# Patient Record
Sex: FEMALE | Race: BLACK OR AFRICAN AMERICAN
[De-identification: names, ages, dates, MRNs, and addresses within clinical notes are randomized per-mention and may not be internally consistent; named-entity substitution may affect disease eponyms.]

---

## 2024-05-29 ENCOUNTER — APPOINTMENT (OUTPATIENT)
Dept: ORTHOPEDIC SURGERY | Facility: CLINIC | Age: 39
End: 2024-05-29
Payer: OTHER MISCELLANEOUS

## 2024-05-29 VITALS — BODY MASS INDEX: 27.49 KG/M2 | WEIGHT: 165 LBS | HEIGHT: 65 IN

## 2024-05-29 DIAGNOSIS — M54.16 RADICULOPATHY, LUMBAR REGION: ICD-10-CM

## 2024-05-29 DIAGNOSIS — Z78.9 OTHER SPECIFIED HEALTH STATUS: ICD-10-CM

## 2024-05-29 DIAGNOSIS — M25.522 PAIN IN LEFT ELBOW: ICD-10-CM

## 2024-05-29 DIAGNOSIS — M25.512 PAIN IN LEFT SHOULDER: ICD-10-CM

## 2024-05-29 PROCEDURE — 99203 OFFICE O/P NEW LOW 30 MIN: CPT

## 2024-05-29 PROCEDURE — 72040 X-RAY EXAM NECK SPINE 2-3 VW: CPT

## 2024-05-29 PROCEDURE — 72100 X-RAY EXAM L-S SPINE 2/3 VWS: CPT

## 2024-06-02 NOTE — PHYSICAL EXAM
[Rotation to left] : rotation to left [Rotation to right] : rotation to right [5___] : right grasp 5[unfilled]/5 [4___] : left wrist flexors 4[unfilled]/5 [Flexion] : flexion [Extension] : extension [Bending to left] : bending to left [Bending to right] : bending to right [de-identified] : Constitutional: - General Appearance: Unremarkable Body Habitus Well Developed Well Nourished Body Habitus No Deformities Well Groomed Ability To communicate: Normal Neurologic: Global sensation is intact to upper and lower extremities. See examination of Neck and/or Spine for exceptions. Orientation to Time, Place and Person is: Normal Mood And Affect is Normal Skin: - Head/Face, Right Upper/Lower Extremity, Left Upper/Lower Extremity: Normal See Examination of Neck and/or Spine for exceptions Cardiovascular: Peripheral Cardiovascular System is Normal Palpation of Lymph Nodes: Normal Palpation of lymph nodes in: Axilla, Cervical, Inguinal Abnormal Palpation of lymph nodes in: None  [de-identified] : LT arm resisted abduction 4-/5 secondary to pain above shoulder and elbow. triceps 4-/5 secondary to pain.  [de-identified] : left lateral flexion 10 degrees [de-identified] : right lateral flexion 10 degrees [FreeTextEntry8] : L>R sciatic notch.  [de-identified] : BL paresthesia's in LE diffused.  3+ patella reflex. 4+ Achilles.  3 beats of clonus BL.  [TWNoteComboBox7] : forward flexion 60 degrees [de-identified] : extension 0 degrees [] : full range of motion with pain in all planes of motion [FreeTextEntry9] : pain w/ rom of LT shoulder. Pain in lateral epicondyle on LT.

## 2024-06-02 NOTE — HISTORY OF PRESENT ILLNESS
[Work related] : work related [Sudden] : sudden [8] : 8 [9] : 9 [Burning] : burning [Dull/Aching] : dull/aching [Radiating] : radiating [Sharp] : sharp [Shooting] : shooting [Stabbing] : stabbing [Tightness] : tightness [Tingling] : tingling [Constant] : constant [Ice] : ice [Lying in bed] : lying in bed [Not working due to injury] : Work status: not working due to injury [de-identified] : 05/29/2024:  39 Y F presenting today for an initial evaluation. Workplace injury on 04/17/24. 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 Mechanism of injury- pt reports she slid on wet floor in room she entered, struck her head, injured arm, neck and low back in process on fall. Pt did lose consciousness and had an inability to speak for 2 hrs. Seen at [place] [facility] images completed (not specified type) but pt did not bring films bur verbally reports findings were normal. She reports mensuration started immediately after striking head. Reports neck pain w/ radicular symptoms to LUE. C/o severe neck and low back pains. Numbness/tingling in RUE, pain in LUE. BL LE paresthesia's. Treating w/ Bengay patch and Tylenol, mild relief. Pt has not been working since DOI. Seen by Dr. Davidson, referred to neurologist due to persistent concussion symptoms of headaches. Pt reports Dr. Aragon Rx'd medication that was She unable to take due to insurance. Pt has not treated w/ PT or muscle relaxers at this time. Chief complaint at this time is LT sided neck, trap, lt elbow, low back and LE pains.  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

## 2024-06-02 NOTE — DATA REVIEWED
[FreeTextEntry1] : On my interpretations of these images from [facility] in [place] on 05/29/2024: I have independently reviewed and interpreted these images. cervical xr- mild loss of vertebral height of C5 and c6. there is no listhesis, mild DDD of C4/5 C5/6, straightening of cervical lordosis, no fxs noted.   On my interpretations of these images from [facility] in [place] on 05/29/2024: I have independently reviewed and interpreted these images. 2 V lumbar xr- ap NL, lateral- NL.

## 2024-06-02 NOTE — DISCUSSION/SUMMARY
[de-identified] : 39 Y F w/ cervical radiculopathy, lumbar radiculopathy.   Significant weakness in LUE on exam today, 3-4 beats of clonus BL, + Moraes's sign BL, LUE pains and radiculopathy, I am requesting a cervical MRI for concern for cervical myelopathy. Significant pain with LT shoulder ROM, requesting LT shoulder MRI to eval for RTC. Isolated pains to lt elbow, requesting LT elbow MRI to eval for fx. She may attend PT after MRI is complete.  Patient was provided with a referral for cervical and lumbar physical therapy to work on stretching, strengthening and range of motion. Patient was provided with a cervical and lumbar home exercise program.   F/U in 1-2 WKS.   Prior to appointment and during encounter with patient extensive medical records were reviewed including but not limited to, hospital records, out patient records, imaging results, and lab data. During this appointment the patient was examined, diagnoses were discussed and explained in a face to face manner. In addition extensive time was spent reviewing aforementioned diagnostic studies. Counseling including abnormal image results, differential diagnoses, treatment options, risk and benefits, lifestyle changes, current condition, and current medications was performed. Patient's comments, questions, and concerns were address and patient verbalized understanding. Based on this patient's presentation at our office, which is an orthopedic spine surgeon's office, this patient inherently / intrinsically has a risk, however minute, of developing issues such as Cauda equina syndrome, bowel and bladder changes, or progression of motor or neurological deficits such as paralysis which may be permanent.   I, Abbey Marin, attest that this documentation has been prepared under the direction and in the presence of provider Burak Castano MD.

## 2024-06-04 ENCOUNTER — APPOINTMENT (OUTPATIENT)
Dept: MRI IMAGING | Facility: CLINIC | Age: 39
End: 2024-06-04

## 2024-06-05 ENCOUNTER — APPOINTMENT (OUTPATIENT)
Dept: MRI IMAGING | Facility: CLINIC | Age: 39
End: 2024-06-05

## 2024-06-21 ENCOUNTER — APPOINTMENT (OUTPATIENT)
Dept: ORTHOPEDIC SURGERY | Facility: CLINIC | Age: 39
End: 2024-06-21
Payer: OTHER MISCELLANEOUS

## 2024-06-21 VITALS — WEIGHT: 165 LBS | BODY MASS INDEX: 27.49 KG/M2 | HEIGHT: 65 IN

## 2024-06-21 DIAGNOSIS — M54.12 RADICULOPATHY, CERVICAL REGION: ICD-10-CM

## 2024-06-21 DIAGNOSIS — G95.9 DISEASE OF SPINAL CORD, UNSPECIFIED: ICD-10-CM

## 2024-06-21 PROCEDURE — 99213 OFFICE O/P EST LOW 20 MIN: CPT

## 2024-06-29 NOTE — DISCUSSION/SUMMARY
[de-identified] : 39 Y F w/ cervical radiculopathy, lumbar radiculopathy, cervical and lumbar strains.  I continue to believe obtaining MRI images will be beneficial for the pt as her diagnosis can properly be updated and treated appropriately. Significant weakness in LUE on exam today, 3-4 beats of clonus BL, + Moraes's sign BL, LUE pains and radiculopathy, I am requesting a cervical MRI for concern for cervical myelopathy and to properly update treatment plan. Significant pain with LT shoulder ROM, requesting LT shoulder MRI to eval for RTC tear. Isolated pains to LT elbow, requesting LT elbow MRI to eval for fx.    F/U in 4-6 WKS.    Prior to appointment and during encounter with patient extensive medical records were reviewed including but not limited to, hospital records, out patient records, imaging results, and lab data. During this appointment the patient was examined, diagnoses were discussed and explained in a face to face manner. In addition extensive time was spent reviewing aforementioned diagnostic studies. Counseling including abnormal image results, differential diagnoses, treatment options, risk and benefits, lifestyle changes, current condition, and current medications was performed. Patient's comments, questions, and concerns were address and patient verbalized understanding. Based on this patient's presentation at our office, which is an orthopedic spine surgeon's office, this patient inherently / intrinsically has a risk, however minute, of developing issues such as Cauda equina syndrome, bowel and bladder changes, or progression of motor or neurological deficits such as paralysis which may be permanent.   I, Abbey Marin, attest that this documentation has been prepared under the direction and in the presence of provider Burak Castano MD.

## 2024-06-29 NOTE — PHYSICAL EXAM
[Rotation to left] : rotation to left [Rotation to right] : rotation to right [5___] : right grasp 5[unfilled]/5 [Flexion] : flexion [Bending to left] : bending to left [Extension] : extension [Bending to right] : bending to right [4___] : left grasp 4[unfilled]/5 [de-identified] : Constitutional: - General Appearance: Unremarkable Body Habitus Well Developed Well Nourished Body Habitus No Deformities Well Groomed Ability To communicate: Normal Neurologic: Global sensation is intact to upper and lower extremities. See examination of Neck and/or Spine for exceptions. Orientation to Time, Place and Person is: Normal Mood And Affect is Normal Skin: - Head/Face, Right Upper/Lower Extremity, Left Upper/Lower Extremity: Normal See Examination of Neck and/or Spine for exceptions Cardiovascular: Peripheral Cardiovascular System is Normal Palpation of Lymph Nodes: Normal Palpation of lymph nodes in: Axilla, Cervical, Inguinal Abnormal Palpation of lymph nodes in: None  [de-identified] : pain into shoulder LT. digital extension 4/5 LT [de-identified] : left lateral flexion 10 degrees [de-identified] : right lateral flexion 10 degrees [FreeTextEntry8] : L>R sciatic notch.  [de-identified] : BL paresthesia's in LE diffused.  3+ patella reflex. 4+ Achilles.  3 beats of clonus BL.  [TWNoteComboBox7] : forward flexion 60 degrees [de-identified] : extension 0 degrees [] : full range of motion with pain in all planes of motion [FreeTextEntry9] : pain w/ rom of LT shoulder. Pain in lateral epicondyle on LT.  can abduct lt arm to 100 degrees.

## 2024-06-29 NOTE — HISTORY OF PRESENT ILLNESS
[Neck] : neck [Lower back] : lower back [Work related] : work related [7] : 7 [6] : 6 [Burning] : burning [Radiating] : radiating [Sharp] : sharp [Throbbing] : throbbing [Constant] : constant [Household chores] : household chores [Work] : work [Sitting] : sitting [Standing] : standing [de-identified] : 06/21/2024: Patient presenting today for a W/C FUV. MRI request denied by W/C, reasoning is unknown. She is ambulating with lumbar brace to aid in symptom control, unsure if provided relief. Persistent neck pains. Neck pain level is a 7/10. Radiation to LUE. Intermittent dropping items in left arm. Persistent BL UE and LE paresthesia's.   05/29/2024:  39 Y F presenting today for an initial evaluation. Workplace injury on 04/17/24. 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 Mechanism of injury- pt reports she slid on wet floor in room she entered, struck her head, injured arm, neck and low back in process on fall. Pt did lose consciousness and had an inability to speak for 2 hrs. Seen at [place] [facility] images completed (not specified type) but pt did not bring films but verbally reports findings were normal. She reports mensuration started immediately after striking head. Reports neck pain w/ radicular symptoms to LUE. C/o severe neck and low back pains. Numbness/tingling in RUE, pain in LUE. BL LE paresthesia's. Treating w/ Bengay patch and Tylenol, mild relief. Pt has not been working since DOI. Seen by Dr. Davidson, referred to neurologist due to persistent concussion symptoms of headaches. Pt reports Dr. Davidson Rx'd medication that was She unable to take due to insurance. Pt has not treated w/ PT or muscle relaxers at this time. Chief complaint at this time is LT sided neck, trap, lt elbow, low back and LE pains.  [] : no [FreeTextEntry3] : 04/17/2024 [FreeTextEntry5] : Cervical 6/10 at rest Lumbar 9/10 at rest [FreeTextEntry7] : Left shoulder, left elbow [FreeTextEntry9] : Tylenol [de-identified] : xray [facility]

## 2024-06-29 NOTE — DATA REVIEWED
[FreeTextEntry1] : On my interpretations of these images from [facility] in [place] on 05/29/2024: I have independently reviewed and interpreted these images. cervical xr- mild loss of vertebral height of C5 and c6. there is no listhesis, mild DDD of C4/5 C5/6, straightening of cervical lordosis, no fxs noted.   On my interpretations of these images from [facility] in [place] on 05/29/2024: I have independently reviewed and interpreted these images. 2 V lumbar xr- ap NL, lateral- NL.

## 2024-07-31 ENCOUNTER — APPOINTMENT (OUTPATIENT)
Dept: ORTHOPEDIC SURGERY | Facility: CLINIC | Age: 39
End: 2024-07-31
Payer: OTHER MISCELLANEOUS

## 2024-07-31 VITALS — BODY MASS INDEX: 27.49 KG/M2 | HEIGHT: 65 IN | WEIGHT: 165 LBS

## 2024-07-31 DIAGNOSIS — M54.12 RADICULOPATHY, CERVICAL REGION: ICD-10-CM

## 2024-07-31 DIAGNOSIS — M25.522 PAIN IN LEFT ELBOW: ICD-10-CM

## 2024-07-31 DIAGNOSIS — G95.9 DISEASE OF SPINAL CORD, UNSPECIFIED: ICD-10-CM

## 2024-07-31 DIAGNOSIS — M54.16 RADICULOPATHY, LUMBAR REGION: ICD-10-CM

## 2024-07-31 PROCEDURE — 99214 OFFICE O/P EST MOD 30 MIN: CPT

## 2024-08-04 NOTE — PHYSICAL EXAM
[Rotation to left] : rotation to left [Rotation to right] : rotation to right [5___] : right grasp 5[unfilled]/5 [Bending to left] : bending to left [Bending to right] : bending to right [Flexion] : flexion [Extension] : extension [Biceps 2+] : biceps 2+ [Triceps 2+] : triceps 2+ [Brachioradialis 2+] : brachioradialis 2+ [4___] : left dorsiflexors 4[unfilled]/5 [de-identified] : Constitutional: - General Appearance: Unremarkable Body Habitus Well Developed Well Nourished Body Habitus No Deformities Well Groomed Ability To communicate: Normal Neurologic: Global sensation is intact to upper and lower extremities. See examination of Neck and/or Spine for exceptions. Orientation to Time, Place and Person is: Normal Mood And Affect is Normal Skin: - Head/Face, Right Upper/Lower Extremity, Left Upper/Lower Extremity: Normal See Examination of Neck and/or Spine for exceptions Cardiovascular: Peripheral Cardiovascular System is Normal Palpation of Lymph Nodes: Normal Palpation of lymph nodes in: Axilla, Cervical, Inguinal Abnormal Palpation of lymph nodes in: None  [de-identified] : left lateral flexion 20 degrees [de-identified] : right lateral flexion 30 degrees [FreeTextEntry8] : L>R sciatic notch tenderness.  [de-identified] : 2+ Reflexes.  diffused paresthesia's in LLE.  [TWNoteComboBox7] : forward flexion 60 degrees [de-identified] : extension 0 degrees [] : full range of motion with pain in all planes of motion [FreeTextEntry9] : pain w/ rom of LT shoulder. Pain in lateral epicondyle on LT.  can abduct lt arm to 100 degrees.  very limited rom of lt shoulder.

## 2024-08-04 NOTE — PHYSICAL EXAM
[Rotation to left] : rotation to left [Rotation to right] : rotation to right [5___] : right grasp 5[unfilled]/5 [Bending to left] : bending to left [Bending to right] : bending to right [Flexion] : flexion [Extension] : extension [Biceps 2+] : biceps 2+ [Triceps 2+] : triceps 2+ [Brachioradialis 2+] : brachioradialis 2+ [4___] : left dorsiflexors 4[unfilled]/5 [de-identified] : Constitutional: - General Appearance: Unremarkable Body Habitus Well Developed Well Nourished Body Habitus No Deformities Well Groomed Ability To communicate: Normal Neurologic: Global sensation is intact to upper and lower extremities. See examination of Neck and/or Spine for exceptions. Orientation to Time, Place and Person is: Normal Mood And Affect is Normal Skin: - Head/Face, Right Upper/Lower Extremity, Left Upper/Lower Extremity: Normal See Examination of Neck and/or Spine for exceptions Cardiovascular: Peripheral Cardiovascular System is Normal Palpation of Lymph Nodes: Normal Palpation of lymph nodes in: Axilla, Cervical, Inguinal Abnormal Palpation of lymph nodes in: None  [de-identified] : left lateral flexion 20 degrees [de-identified] : right lateral flexion 30 degrees [FreeTextEntry8] : L>R sciatic notch tenderness.  [de-identified] : 2+ Reflexes.  diffused paresthesia's in LLE.  [TWNoteComboBox7] : forward flexion 60 degrees [de-identified] : extension 0 degrees [] : full range of motion with pain in all planes of motion [FreeTextEntry9] : pain w/ rom of LT shoulder. Pain in lateral epicondyle on LT.  can abduct lt arm to 100 degrees.  very limited rom of lt shoulder.

## 2024-08-04 NOTE — PHYSICAL EXAM
[Rotation to left] : rotation to left [Rotation to right] : rotation to right [5___] : right grasp 5[unfilled]/5 [Bending to left] : bending to left [Bending to right] : bending to right [Flexion] : flexion [Extension] : extension [Biceps 2+] : biceps 2+ [Triceps 2+] : triceps 2+ [Brachioradialis 2+] : brachioradialis 2+ [4___] : left dorsiflexors 4[unfilled]/5 [de-identified] : Constitutional: - General Appearance: Unremarkable Body Habitus Well Developed Well Nourished Body Habitus No Deformities Well Groomed Ability To communicate: Normal Neurologic: Global sensation is intact to upper and lower extremities. See examination of Neck and/or Spine for exceptions. Orientation to Time, Place and Person is: Normal Mood And Affect is Normal Skin: - Head/Face, Right Upper/Lower Extremity, Left Upper/Lower Extremity: Normal See Examination of Neck and/or Spine for exceptions Cardiovascular: Peripheral Cardiovascular System is Normal Palpation of Lymph Nodes: Normal Palpation of lymph nodes in: Axilla, Cervical, Inguinal Abnormal Palpation of lymph nodes in: None  [de-identified] : left lateral flexion 20 degrees [de-identified] : right lateral flexion 30 degrees [FreeTextEntry8] : L>R sciatic notch tenderness.  [de-identified] : 2+ Reflexes.  diffused paresthesia's in LLE.  [TWNoteComboBox7] : forward flexion 60 degrees [de-identified] : extension 0 degrees [] : full range of motion with pain in all planes of motion [FreeTextEntry9] : pain w/ rom of LT shoulder. Pain in lateral epicondyle on LT.  can abduct lt arm to 100 degrees.  very limited rom of lt shoulder.
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
## 2024-08-04 NOTE — DISCUSSION/SUMMARY
[de-identified] : 39 Y F w/ cervical radiculopathy, lumbar radiculopathy, cervical and lumbar strains. W/C DOI 04/17/24.   When pt fell while slipping on wet floor she injured her cervical spine and lumbar spine. As well as her LT arm, LT shoulder, and LT elbow, which may be attributed pains that originated in the neck or direct injury individual structures, will have UE evaluated by partners in that time- referral given to shoulder and hand. I have concerns that she is developing adhesive capsulitis, secondary to the inability move her shoulder as it is too painful.   I continue to believe MRIs of the cervical spine will be most appropriate to further complete evaluation and understanding of symptomatology.   I do not understand why MRIs have not been authorized, she clearly has radicular symptoms in LUE and LLE. Global LUE weakness on exam (4/5) She has diffused weakness in the LLE.  These findings can only be further evaluated with MRIs of the cervical and lumbar spine.   I do not understand why she has not had PT for her neck and back as her injury was 3 months ago and she continues to report progression.  pt's cervical & lumbar spasms and stiffness are progressing and increasing in severity as she unable to move secondary to severe pains. Patient was provided with a referral for cervical and lumbar physical therapy to work on stretching, strengthening and range of motion.    She cannot work at this time b/c of persistent and severe neck, lumbar, lue and lle pains. Patient remains temporarily and totally disabled from customary work at this time due to symptom severity. Patient will stay OOW. Note given.  Will see back in 1 month to review cervical and lumbar MRIs.   Prior to appointment and during encounter with patient extensive medical records were reviewed including but not limited to, hospital records, out patient records, imaging results, and lab data. During this appointment the patient was examined, diagnoses were discussed and explained in a face to face manner. In addition extensive time was spent reviewing aforementioned diagnostic studies. Counseling including abnormal image results, differential diagnoses, treatment options, risk and benefits, lifestyle changes, current condition, and current medications was performed. Patient's comments, questions, and concerns were address and patient verbalized understanding. Based on this patient's presentation at our office, which is an orthopedic spine surgeon's office, this patient inherently / intrinsically has a risk, however minute, of developing issues such as Cauda equina syndrome, bowel and bladder changes, or progression of motor or neurological deficits such as paralysis which may be permanent.   I, Abbey Marin, attest that this documentation has been prepared under the direction and in the presence of provider Burak Castano MD.

## 2024-08-04 NOTE — HISTORY OF PRESENT ILLNESS
[9] : 9 [8] : 8 [Radiating] : radiating [Constant] : constant [Not working due to injury] : Work status: not working due to injury [] : yes [de-identified] : 07/31/2024: Patient presenting today for a  FUV on 04/17/24 s/p slip and fall injury. She has not been treating with PT due to w/c not authorizing. She is not working. Neck pain radiates to LUE with weakness and numbness. Neck pain level is an 8/10. Persistent back pain.  LT sided LE radicular pains posteriorly from sciatic notch to posterior calf. Numbness & tingling in LLE.   06/21/2024: Patient presenting today for a W/C FUV. MRI request denied by W/C, reasoning is unknown. She is ambulating with lumbar brace to aid in symptom control, unsure if provided relief. Persistent neck pains. Neck pain level is a 7/10. Radiation to LUE. Intermittent dropping items in left arm. Persistent BL UE and LE paresthesia's.   05/29/2024:  39 Y F presenting today for an initial evaluation. Workplace injury on 04/17/24. 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 Mechanism of injury- pt reports she slid on wet floor in room she entered, struck her head, injured arm, neck and low back in process on fall. Pt did lose consciousness and had an inability to speak for 2 hrs. Seen at [place] ED images completed (not specified type) but pt did not bring films but verbally reports findings were normal. She reports mensuration started immediately after striking head. Reports neck pain w/ radicular symptoms to LUE. C/o severe neck and low back pains. Numbness/tingling in RUE, pain in LUE. BL LE paresthesia's. Treating w/ Bengay patch and Tylenol, mild relief. Pt has not been working since DOI. Seen by Dr. Davidson, referred to neurologist due to persistent concussion symptoms of headaches. Pt reports Dr. Davidson Rx'd medication that was She unable to take due to insurance. Pt has not treated w/ PT or muscle relaxers at this time. Chief complaint at this time is LT sided neck, trap, lt elbow, low back and LE pains.  [FreeTextEntry7] : lt shoulder/elbow [FreeTextEntry9] : bengar patches, naproxen [de-identified] : pain mgmt [de-identified] : movement

## 2024-08-04 NOTE — DATA REVIEWED
[FreeTextEntry1] : On my interpretations of these images from [facility] in [place] on 05/29/2024: I have independently reviewed and interpreted these images. cervical xr- mild loss of vertebral height of C5 and c6. there is no listhesis, mild DDD of C4/5 C5/6, straightening of cervical lordosis, no fxs noted.   On my interpretations of these images from [facility] in [place] on 05/29/2024: I have independently reviewed and interpreted these images. 2 V lumbar xr- ap NL, lateral- NL.

## 2024-08-04 NOTE — DISCUSSION/SUMMARY
[de-identified] : 39 Y F w/ cervical radiculopathy, lumbar radiculopathy, cervical and lumbar strains. W/C DOI 04/17/24.   When pt fell while slipping on wet floor she injured her cervical spine and lumbar spine. As well as her LT arm, LT shoulder, and LT elbow, which may be attributed pains that originated in the neck or direct injury individual structures, will have UE evaluated by partners in that time- referral given to shoulder and hand. I have concerns that she is developing adhesive capsulitis, secondary to the inability move her shoulder as it is too painful.   I continue to believe MRIs of the cervical spine will be most appropriate to further complete evaluation and understanding of symptomatology.   I do not understand why MRIs have not been authorized, she clearly has radicular symptoms in LUE and LLE. Global LUE weakness on exam (4/5) She has diffused weakness in the LLE.  These findings can only be further evaluated with MRIs of the cervical and lumbar spine.   I do not understand why she has not had PT for her neck and back as her injury was 3 months ago and she continues to report progression.  pt's cervical & lumbar spasms and stiffness are progressing and increasing in severity as she unable to move secondary to severe pains. Patient was provided with a referral for cervical and lumbar physical therapy to work on stretching, strengthening and range of motion.    She cannot work at this time b/c of persistent and severe neck, lumbar, lue and lle pains. Patient remains temporarily and totally disabled from customary work at this time due to symptom severity. Patient will stay OOW. Note given.  Will see back in 1 month to review cervical and lumbar MRIs.   Prior to appointment and during encounter with patient extensive medical records were reviewed including but not limited to, hospital records, out patient records, imaging results, and lab data. During this appointment the patient was examined, diagnoses were discussed and explained in a face to face manner. In addition extensive time was spent reviewing aforementioned diagnostic studies. Counseling including abnormal image results, differential diagnoses, treatment options, risk and benefits, lifestyle changes, current condition, and current medications was performed. Patient's comments, questions, and concerns were address and patient verbalized understanding. Based on this patient's presentation at our office, which is an orthopedic spine surgeon's office, this patient inherently / intrinsically has a risk, however minute, of developing issues such as Cauda equina syndrome, bowel and bladder changes, or progression of motor or neurological deficits such as paralysis which may be permanent.   I, Abbey Marin, attest that this documentation has been prepared under the direction and in the presence of provider Burak Castano MD.

## 2024-08-04 NOTE — DATA REVIEWED
[FreeTextEntry1] : On my interpretations of these images from [facility] in [place] on 05/29/2024: I have independently reviewed and interpreted these images. cervical xr- mild loss of vertebral height of C5 and c6. there is no listhesis, mild DDD of C4/5 C5/6, straightening of cervical lordosis, no fxs noted.   On my interpretations of these images from [facility] in [place] on 05/29/2024: I have independently reviewed and interpreted these images. 2 V lumbar xr- ap NL, lateral- NL.

## 2024-08-04 NOTE — HISTORY OF PRESENT ILLNESS
[9] : 9 [8] : 8 [Radiating] : radiating [Constant] : constant [Not working due to injury] : Work status: not working due to injury [] : yes [de-identified] : 07/31/2024: Patient presenting today for a  FUV on 04/17/24 s/p slip and fall injury. She has not been treating with PT due to w/c not authorizing. She is not working. Neck pain radiates to LUE with weakness and numbness. Neck pain level is an 8/10. Persistent back pain.  LT sided LE radicular pains posteriorly from sciatic notch to posterior calf. Numbness & tingling in LLE.   06/21/2024: Patient presenting today for a W/C FUV. MRI request denied by W/C, reasoning is unknown. She is ambulating with lumbar brace to aid in symptom control, unsure if provided relief. Persistent neck pains. Neck pain level is a 7/10. Radiation to LUE. Intermittent dropping items in left arm. Persistent BL UE and LE paresthesia's.   05/29/2024:  39 Y F presenting today for an initial evaluation. Workplace injury on 04/17/24. 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 Mechanism of injury- pt reports she slid on wet floor in room she entered, struck her head, injured arm, neck and low back in process on fall. Pt did lose consciousness and had an inability to speak for 2 hrs. Seen at [place] ED images completed (not specified type) but pt did not bring films but verbally reports findings were normal. She reports mensuration started immediately after striking head. Reports neck pain w/ radicular symptoms to LUE. C/o severe neck and low back pains. Numbness/tingling in RUE, pain in LUE. BL LE paresthesia's. Treating w/ Bengay patch and Tylenol, mild relief. Pt has not been working since DOI. Seen by Dr. Davidson, referred to neurologist due to persistent concussion symptoms of headaches. Pt reports Dr. Davidson Rx'd medication that was She unable to take due to insurance. Pt has not treated w/ PT or muscle relaxers at this time. Chief complaint at this time is LT sided neck, trap, lt elbow, low back and LE pains.  [FreeTextEntry7] : lt shoulder/elbow [FreeTextEntry9] : bengar patches, naproxen [de-identified] : movement [de-identified] : pain mgmt

## 2024-08-04 NOTE — DATA REVIEWED
[FreeTextEntry1] : On my interpretations of these images from [facility] in [place] on 05/29/2024: I have independently reviewed and interpreted these images. cervical xr- mild loss of vertebral height of C5 and c6. there is no listhesis, mild DDD of C4/5 C5/6, straightening of cervical lordosis, no fxs noted.   On my interpretations of these images from [facility] in [place] on 05/29/2024: I have independently reviewed and interpreted these images. 2 V lumbar xr- ap NL, lateral- NL.

## 2024-08-04 NOTE — DISCUSSION/SUMMARY
[de-identified] : 39 Y F w/ cervical radiculopathy, lumbar radiculopathy, cervical and lumbar strains. W/C DOI 04/17/24.   When pt fell while slipping on wet floor she injured her cervical spine and lumbar spine. As well as her LT arm, LT shoulder, and LT elbow, which may be attributed pains that originated in the neck or direct injury individual structures, will have UE evaluated by partners in that time- referral given to shoulder and hand. I have concerns that she is developing adhesive capsulitis, secondary to the inability move her shoulder as it is too painful.   I continue to believe MRIs of the cervical spine will be most appropriate to further complete evaluation and understanding of symptomatology.   I do not understand why MRIs have not been authorized, she clearly has radicular symptoms in LUE and LLE. Global LUE weakness on exam (4/5) She has diffused weakness in the LLE.  These findings can only be further evaluated with MRIs of the cervical and lumbar spine.   I do not understand why she has not had PT for her neck and back as her injury was 3 months ago and she continues to report progression.  pt's cervical & lumbar spasms and stiffness are progressing and increasing in severity as she unable to move secondary to severe pains. Patient was provided with a referral for cervical and lumbar physical therapy to work on stretching, strengthening and range of motion.    She cannot work at this time b/c of persistent and severe neck, lumbar, lue and lle pains. Patient remains temporarily and totally disabled from customary work at this time due to symptom severity. Patient will stay OOW. Note given.  Will see back in 1 month to review cervical and lumbar MRIs.   Prior to appointment and during encounter with patient extensive medical records were reviewed including but not limited to, hospital records, out patient records, imaging results, and lab data. During this appointment the patient was examined, diagnoses were discussed and explained in a face to face manner. In addition extensive time was spent reviewing aforementioned diagnostic studies. Counseling including abnormal image results, differential diagnoses, treatment options, risk and benefits, lifestyle changes, current condition, and current medications was performed. Patient's comments, questions, and concerns were address and patient verbalized understanding. Based on this patient's presentation at our office, which is an orthopedic spine surgeon's office, this patient inherently / intrinsically has a risk, however minute, of developing issues such as Cauda equina syndrome, bowel and bladder changes, or progression of motor or neurological deficits such as paralysis which may be permanent.   I, Abbey Marin, attest that this documentation has been prepared under the direction and in the presence of provider Burak Castano MD.

## 2024-08-04 NOTE — HISTORY OF PRESENT ILLNESS
[9] : 9 [8] : 8 [Radiating] : radiating [Constant] : constant [Not working due to injury] : Work status: not working due to injury [] : yes [de-identified] : 07/31/2024: Patient presenting today for a  FUV on 04/17/24 s/p slip and fall injury. She has not been treating with PT due to w/c not authorizing. She is not working. Neck pain radiates to LUE with weakness and numbness. Neck pain level is an 8/10. Persistent back pain.  LT sided LE radicular pains posteriorly from sciatic notch to posterior calf. Numbness & tingling in LLE.   06/21/2024: Patient presenting today for a W/C FUV. MRI request denied by W/C, reasoning is unknown. She is ambulating with lumbar brace to aid in symptom control, unsure if provided relief. Persistent neck pains. Neck pain level is a 7/10. Radiation to LUE. Intermittent dropping items in left arm. Persistent BL UE and LE paresthesia's.   05/29/2024:  39 Y F presenting today for an initial evaluation. Workplace injury on 04/17/24. 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 Mechanism of injury- pt reports she slid on wet floor in room she entered, struck her head, injured arm, neck and low back in process on fall. Pt did lose consciousness and had an inability to speak for 2 hrs. Seen at [place] ED images completed (not specified type) but pt did not bring films but verbally reports findings were normal. She reports mensuration started immediately after striking head. Reports neck pain w/ radicular symptoms to LUE. C/o severe neck and low back pains. Numbness/tingling in RUE, pain in LUE. BL LE paresthesia's. Treating w/ Bengay patch and Tylenol, mild relief. Pt has not been working since DOI. Seen by Dr. Davidson, referred to neurologist due to persistent concussion symptoms of headaches. Pt reports Dr. Davidson Rx'd medication that was She unable to take due to insurance. Pt has not treated w/ PT or muscle relaxers at this time. Chief complaint at this time is LT sided neck, trap, lt elbow, low back and LE pains.  [FreeTextEntry7] : lt shoulder/elbow [FreeTextEntry9] : bengar patches, naproxen [de-identified] : pain mgmt [de-identified] : movement

## 2024-08-07 ENCOUNTER — APPOINTMENT (OUTPATIENT)
Dept: ORTHOPEDIC SURGERY | Facility: CLINIC | Age: 39
End: 2024-08-07

## 2024-08-07 PROBLEM — M25.512 ACUTE PAIN OF LEFT SHOULDER: Status: ACTIVE | Noted: 2024-08-07

## 2024-08-07 PROBLEM — S43.52XD SPRAIN OF LEFT ACROMIOCLAVICULAR LIGAMENT, SUBSEQUENT ENCOUNTER: Status: ACTIVE | Noted: 2024-08-07

## 2024-08-07 PROBLEM — M75.52 BURSITIS OF SHOULDER, LEFT: Status: ACTIVE | Noted: 2024-08-07

## 2024-08-07 PROCEDURE — 99204 OFFICE O/P NEW MOD 45 MIN: CPT

## 2024-08-07 PROCEDURE — 73030 X-RAY EXAM OF SHOULDER: CPT | Mod: LT

## 2024-08-07 NOTE — WORK
[Torn Ligament/Tendon/Muscle] : torn ligament, tendon or muscle [Was the competent medical cause of the injury] : was the competent medical cause of the injury [Are consistent with the injury] : are consistent with the injury [Total (100%)] : total (100%) [Cannot return to work because ________] : cannot return to work because [unfilled] [N/A] : : Not Applicable [Patient] : patient [No Rx restrictions] : No Rx restrictions. [I provided the services listed above] :  I provided the services listed above. [FreeTextEntry1] : fair

## 2024-08-07 NOTE — HISTORY OF PRESENT ILLNESS
[de-identified] : 39-year-old female presenting for evaluation of left shoulder pain from a work-related injury that occurred 4/17/2024.  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  Reports injury occurred as a traumatic slip and fall onto water.  Patient has been under the care of spine specialist as she had also injured her cervical and lumbar spine.  Referred today for further evaluation of left shoulder pain.  Patient reports diffuse left shoulder discomfort.  Admits to limited range of motion secondary to pain.  Pain is constant, severe. She has been out of work since the injury.

## 2024-08-07 NOTE — DISCUSSION/SUMMARY
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  Patient has been initially under the care of a spine specialist and was referred today for further management of her left shoulder pain. X-rays today are nondiagnostic. Patient does have significant guarding and limited range of motion on exam today.  She does have tenderness in the region of her AC joint. Given the traumatic nature of her fall we are recommending MRI to further evaluate rotator cuff tear versus occult fracture. Based on the patient's history and physical exam findings, I am concerned about the possibility of a full-thickness rotator cuff tear. The patient has pain and subjective weakness consistent with this diagnosis. Therefore, I recommend a MRI to evaluate for a rotator cuff tear. This will also aid in evaluating for injury to biceps labral complex as well as for any signs of impingement. The patient will follow-up after MRI to discuss further treatment options. We will also submit for physical therapy to help improve range of motion considering patient has significant guarding and limited active motion on exam today.  Patient will follow-up once the MRI is complete to discuss further treatment options. She has been out of work since the injury.  (1) We discussed a comprehensive treatment plans that included a prescription management plan involving the use of prescription strength medications to include Ibuprofen 600-800 mg TID, versus 500-650 mg Tylenol. We also discussed prescribing topical diclofenac (Voltaren gel) as well as once daily Meloxicam 15 mg.  (2) The patient has More Than One chronic injuries/illnesses as outlined, discussed, and documented by ICD 10 codes listed, as well as the HPI and Plan section. There is a moderate risk of morbidity with further treatment, especially from use of prescription strength medications and possible side effects of these medications which include upset stomach and cardiac/renal issues with long term use were discussed.  (3) I recommended that the patient follow-up with their medical physician to discuss any significant specific potential issues with long term use such as interactions with current medications or with exacerbation of underlying medical morbidities. 
Likely anemia of chronic disease  - Hgb 9.9 on admission, was in the ranges of ~10s during last hospitalization in 01/2023  - Currently hemodynamically stable  - F/u iron panel: iron, ferritin, TIBC, transferrin  - F/u folate, vitamin B12  - Trend CBC

## 2024-08-16 ENCOUNTER — APPOINTMENT (OUTPATIENT)
Dept: ORTHOPEDIC SURGERY | Facility: CLINIC | Age: 39
End: 2024-08-16
Payer: OTHER MISCELLANEOUS

## 2024-08-16 PROCEDURE — 99075 MEDICAL TESTIMONY: CPT

## 2024-08-19 ENCOUNTER — APPOINTMENT (OUTPATIENT)
Dept: ORTHOPEDIC SURGERY | Facility: CLINIC | Age: 39
End: 2024-08-19
Payer: OTHER MISCELLANEOUS

## 2024-08-19 VITALS — HEIGHT: 65 IN | BODY MASS INDEX: 27.49 KG/M2 | WEIGHT: 165 LBS

## 2024-08-19 DIAGNOSIS — S53.402A UNSPECIFIED SPRAIN OF LEFT ELBOW, INITIAL ENCOUNTER: ICD-10-CM

## 2024-08-19 DIAGNOSIS — S50.02XA CONTUSION OF LEFT ELBOW, INITIAL ENCOUNTER: ICD-10-CM

## 2024-08-19 PROCEDURE — 73080 X-RAY EXAM OF ELBOW: CPT | Mod: LT

## 2024-08-19 PROCEDURE — 99203 OFFICE O/P NEW LOW 30 MIN: CPT

## 2024-08-19 NOTE — PHYSICAL EXAM
[] : tenderness over lateral epicondyle [Left] : left elbow [There are no fractures, subluxations or dislocations. No significant abnormalities are seen] : There are no fractures, subluxations or dislocations. No significant abnormalities are seen [Degenerative change] : Degenerative change [FreeTextEntry9] : Full ROM Full suppination and pronation

## 2024-08-19 NOTE — HISTORY OF PRESENT ILLNESS
[Work related] : work related [Gradual] : gradual [8] : 8 [Dull/Aching] : dull/aching [Sharp] : sharp [Shooting] : shooting [Constant] : constant [Household chores] : household chores [Rest] : rest [Not working due to injury] : Work status: not working due to injury [de-identified] : 39 year old female presents LT elbow pain. She slipped on a wet floor. She went to [facility] 4 months ago. She noticed elbow pain later that night  [] : no [FreeTextEntry1] : Left elbow [FreeTextEntry3] : 4/17/2024 [FreeTextEntry7] : left hand [FreeTextEntry5] : 4/17/2024 going into the resident room at work and slipped and fell on wet floor [de-identified] : no bearing weight and no pushing

## 2024-08-19 NOTE — WORK
[Sprain/Strain] : sprain/strain [Are consistent with the injury] : are consistent with the injury [Consistent with my objective findings] : consistent with my objective findings [Partial] : partial [No Rx restrictions] : No Rx restrictions. [I provided the services listed above] :  I provided the services listed above.

## 2024-08-19 NOTE — DISCUSSION/SUMMARY
[de-identified] : Discussed the nature of the diagnosis and risk and benefits of different modalities of treatment. LT elbow contusion   She will obtain an MRI Rx provided RTO after MRI  Male

## 2024-08-28 ENCOUNTER — APPOINTMENT (OUTPATIENT)
Dept: ORTHOPEDIC SURGERY | Facility: CLINIC | Age: 39
End: 2024-08-28
Payer: OTHER MISCELLANEOUS

## 2024-08-28 VITALS — BODY MASS INDEX: 27.66 KG/M2 | HEIGHT: 65 IN | WEIGHT: 166 LBS

## 2024-08-28 DIAGNOSIS — M54.16 RADICULOPATHY, LUMBAR REGION: ICD-10-CM

## 2024-08-28 DIAGNOSIS — G95.9 DISEASE OF SPINAL CORD, UNSPECIFIED: ICD-10-CM

## 2024-08-28 DIAGNOSIS — M54.12 RADICULOPATHY, CERVICAL REGION: ICD-10-CM

## 2024-08-28 PROCEDURE — 99213 OFFICE O/P EST LOW 20 MIN: CPT

## 2024-08-30 ENCOUNTER — APPOINTMENT (OUTPATIENT)
Dept: MRI IMAGING | Facility: CLINIC | Age: 39
End: 2024-08-30

## 2024-09-01 NOTE — PHYSICAL EXAM
[Rotation to left] : rotation to left [Rotation to right] : rotation to right [5___] : right grasp 5[unfilled]/5 [Biceps 2+] : biceps 2+ [Triceps 2+] : triceps 2+ [Brachioradialis 2+] : brachioradialis 2+ [Flexion] : flexion [Extension] : extension [Bending to left] : bending to left [Bending to right] : bending to right [4___] : left dorsiflexors 4[unfilled]/5 [de-identified] : Constitutional: - General Appearance: Unremarkable Body Habitus Well Developed Well Nourished Body Habitus No Deformities Well Groomed Ability To communicate: Normal Neurologic: Global sensation is intact to upper and lower extremities. See examination of Neck and/or Spine for exceptions. Orientation to Time, Place and Person is: Normal Mood And Affect is Normal Skin: - Head/Face, Right Upper/Lower Extremity, Left Upper/Lower Extremity: Normal See Examination of Neck and/or Spine for exceptions Cardiovascular: Peripheral Cardiovascular System is Normal Palpation of Lymph Nodes: Normal Palpation of lymph nodes in: Axilla, Cervical, Inguinal Abnormal Palpation of lymph nodes in: None  [de-identified] : resisted supination L 4/5 [de-identified] : left lateral rotation 45 degrees [TWNoteComboBox6] : right lateral rotation 45 degrees [] : non-antalgic [FreeTextEntry9] : pain w/ returning to extension.  [de-identified] : 2+ Reflexes.  diffused paresthesia's in LLE.  [TWNoteComboBox7] : forward flexion 60 degrees [de-identified] : extension 0 degrees

## 2024-09-01 NOTE — DATA REVIEWED
[FreeTextEntry1] : On my interpretations of these images from [facility] in [place] on 05/29/2024: I have independently reviewed and interpreted these images. cervical xr- mild loss of vertebral height of C5 and c6. there is no listhesis, mild DDD of C4/5 C5/6, straightening of cervical lordosis, no fxs noted.   On my interpretations of these images from [facility] in [place] on 05/29/2024: I have independently reviewed and interpreted these images. 2 V lumbar xr- ap NL, lateral- NL.

## 2024-09-01 NOTE — DISCUSSION/SUMMARY
[de-identified] : 39 Y F w/ cervical radiculopathy, lumbar radiculopathy, cervical and lumbar strains. W/C DOI 04/17/24. Diffused weakness in LUE present on exam.   When pt fell while slipping on wet floor she injured her cervical spine and lumbar spine. Loss of fine motor skills.  C5/6 paresthesia's in LUE. I continue to believe MRIs of the cervical spine will be most appropriate to complete evaluation and understanding of symptomatology, formal request of a cervical MRI to evaluate for HNP vs spinal cord compression.   Pt has persistent and not improving lumbar pains, diffused paresthesia's in LLE, + L straight leg raise on exam, requesting lumbar MRI to eval for HNP.   I do not understand why MRIs have not been authorized, she clearly has radicular symptoms in LUE and LLE. Global LUE weakness on exam (4/5) She has diffused weakness in the LLE.  These findings can only be further evaluated with MRIs of the cervical and lumbar spine.   She cannot work at this time b/c of persistent and severe neck, lumbar, LUE and LLE pains. Patient remains temporarily and totally disabled from customary work at this time due to symptom severity. Patient will stay OOW. Note given.  Will see back in 2 months to review cervical and lumbar MRIs.   Prior to appointment and during encounter with patient extensive medical records were reviewed including but not limited to, hospital records, out patient records, imaging results, and lab data. During this appointment the patient was examined, diagnoses were discussed and explained in a face to face manner. In addition extensive time was spent reviewing aforementioned diagnostic studies. Counseling including abnormal image results, differential diagnoses, treatment options, risk and benefits, lifestyle changes, current condition, and current medications was performed. Patient's comments, questions, and concerns were address and patient verbalized understanding. Based on this patient's presentation at our office, which is an orthopedic spine surgeon's office, this patient inherently / intrinsically has a risk, however minute, of developing issues such as Cauda equina syndrome, bowel and bladder changes, or progression of motor or neurological deficits such as paralysis which may be permanent.   I, Abbey Marin, attest that this documentation has been prepared under the direction and in the presence of provider Burak Castano MD.

## 2024-09-01 NOTE — WORK
[Sprain/Strain] : sprain/strain [Other: ___] : [unfilled] [Was the competent medical cause of the injury] : was the competent medical cause of the injury [Are consistent with the injury] : are consistent with the injury [Consistent with my objective findings] : consistent with my objective findings [Total (100%)] : total (100%) [Reveals pre-existing condition(s) that may affect treatment/prognosis] : reveals pre-existing condition(s) that may affect treatment/prognosis [Cannot return to work because ________] : cannot return to work because [unfilled] [15+ days] : 15+ days [Patient] : patient [No] : No [No Rx restrictions] : No Rx restrictions. [I provided the services listed above] :  I provided the services listed above. [FreeTextEntry1] : fair [FreeTextEntry2] : cervical and lumbar DDD

## 2024-09-01 NOTE — DISCUSSION/SUMMARY
[de-identified] : 39 Y F w/ cervical radiculopathy, lumbar radiculopathy, cervical and lumbar strains. W/C DOI 04/17/24. Diffused weakness in LUE present on exam.   When pt fell while slipping on wet floor she injured her cervical spine and lumbar spine. Loss of fine motor skills.  C5/6 paresthesia's in LUE. I continue to believe MRIs of the cervical spine will be most appropriate to complete evaluation and understanding of symptomatology, formal request of a cervical MRI to evaluate for HNP vs spinal cord compression.   Pt has persistent and not improving lumbar pains, diffused paresthesia's in LLE, + L straight leg raise on exam, requesting lumbar MRI to eval for HNP.   I do not understand why MRIs have not been authorized, she clearly has radicular symptoms in LUE and LLE. Global LUE weakness on exam (4/5) She has diffused weakness in the LLE.  These findings can only be further evaluated with MRIs of the cervical and lumbar spine.   She cannot work at this time b/c of persistent and severe neck, lumbar, LUE and LLE pains. Patient remains temporarily and totally disabled from customary work at this time due to symptom severity. Patient will stay OOW. Note given.  Will see back in 2 months to review cervical and lumbar MRIs.   Prior to appointment and during encounter with patient extensive medical records were reviewed including but not limited to, hospital records, out patient records, imaging results, and lab data. During this appointment the patient was examined, diagnoses were discussed and explained in a face to face manner. In addition extensive time was spent reviewing aforementioned diagnostic studies. Counseling including abnormal image results, differential diagnoses, treatment options, risk and benefits, lifestyle changes, current condition, and current medications was performed. Patient's comments, questions, and concerns were address and patient verbalized understanding. Based on this patient's presentation at our office, which is an orthopedic spine surgeon's office, this patient inherently / intrinsically has a risk, however minute, of developing issues such as Cauda equina syndrome, bowel and bladder changes, or progression of motor or neurological deficits such as paralysis which may be permanent.   I, Abbey Marin, attest that this documentation has been prepared under the direction and in the presence of provider Burak Castano MD.

## 2024-09-01 NOTE — HISTORY OF PRESENT ILLNESS
[Neck] : neck [Lower back] : lower back [Work related] : work related [6] : 6 [5] : 5 [Dull/Aching] : dull/aching [Constant] : constant [Household chores] : household chores [Sleep] : sleep [Rest] : rest [Meds] : meds [Sitting] : sitting [Walking] : walking [Lying in bed] : lying in bed [Not working due to injury] : Work status: not working due to injury [de-identified] : 08/28/2024: Patient presenting today for a W/C FUV. Pt reports mild improvement in neck and back pains but still symptomatic.  She reports case has been established since last visit.  Neck- 8/10. Radiates up skull. Weakness in UE. Intermittent dropping items. Intermittent loss of balance. Losing fine motor skills. She has not returned back to work since DOI.  Back- Midline back pains.    07/31/2024: Patient presenting today for a WC FUV on 04/17/24 s/p slip and fall injury. She has not been treating with PT due to w/c not authorizing. She is not working. Neck pain radiates to LUE with weakness and numbness. Neck pain level is an 8/10. Persistent back pain.  LT sided LE radicular pains posteriorly from sciatic notch to posterior calf. Numbness & tingling in LLE.   06/21/2024: Patient presenting today for a W/C FUV. MRI request denied by W/C, reasoning is unknown. She is ambulating with lumbar brace to aid in symptom control, unsure if provided relief. Persistent neck pains. Neck pain level is a 7/10. Radiation to LUE. Intermittent dropping items in left arm. Persistent BL UE and LE paresthesia's.   05/29/2024:  39 Y F presenting today for an initial evaluation. Workplace injury on 04/17/24. 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 Mechanism of injury- pt reports she slid on wet floor in room she entered, struck her head, injured arm, neck and low back in process on fall. Pt did lose consciousness and had an inability to speak for 2 hrs. Seen at [place] ED images completed (not specified type) but pt did not bring films but verbally reports findings were normal. She reports mensuration started immediately after striking head. Reports neck pain w/ radicular symptoms to LUE. C/o severe neck and low back pains. Numbness/tingling in RUE, pain in LUE. BL LE paresthesia's. Treating w/ Bengay patch and Tylenol, mild relief. Pt has not been working since DOI. Seen by Dr. Davidson, referred to neurologist due to persistent concussion symptoms of headaches. Pt reports Dr. Davidson Rx'd medication that was She unable to take due to insurance. Pt has not treated w/ PT or muscle relaxers at this time. Chief complaint at this time is LT sided neck, trap, lt elbow, low back and LE pains.  [] : no [FreeTextEntry3] : 4/17/2024 [FreeTextEntry6] : Stiffness [FreeTextEntry7] : Lj Buttocks

## 2024-09-01 NOTE — PHYSICAL EXAM
[Rotation to left] : rotation to left [Rotation to right] : rotation to right [5___] : right grasp 5[unfilled]/5 [Biceps 2+] : biceps 2+ [Triceps 2+] : triceps 2+ [Brachioradialis 2+] : brachioradialis 2+ [Flexion] : flexion [Extension] : extension [Bending to left] : bending to left [Bending to right] : bending to right [4___] : left dorsiflexors 4[unfilled]/5 [de-identified] : Constitutional: - General Appearance: Unremarkable Body Habitus Well Developed Well Nourished Body Habitus No Deformities Well Groomed Ability To communicate: Normal Neurologic: Global sensation is intact to upper and lower extremities. See examination of Neck and/or Spine for exceptions. Orientation to Time, Place and Person is: Normal Mood And Affect is Normal Skin: - Head/Face, Right Upper/Lower Extremity, Left Upper/Lower Extremity: Normal See Examination of Neck and/or Spine for exceptions Cardiovascular: Peripheral Cardiovascular System is Normal Palpation of Lymph Nodes: Normal Palpation of lymph nodes in: Axilla, Cervical, Inguinal Abnormal Palpation of lymph nodes in: None  [de-identified] : resisted supination L 4/5 [de-identified] : left lateral rotation 45 degrees [TWNoteComboBox6] : right lateral rotation 45 degrees [] : non-antalgic [FreeTextEntry9] : pain w/ returning to extension.  [de-identified] : 2+ Reflexes.  diffused paresthesia's in LLE.  [TWNoteComboBox7] : forward flexion 60 degrees [de-identified] : extension 0 degrees

## 2024-09-01 NOTE — DATA REVIEWED
[FreeTextEntry1] : On my interpretations of these images from [facility] in [place] on 05/29/2024: I have independently reviewed and interpreted these images. cervical xr- mild loss of vertebral height of C5 and c6. there is no listhesis, mild DDD of C4/5 C5/6, straightening of cervical lordosis, no fxs noted.   On my interpretations of these images from [facility] in [place] on 05/29/2024: I have independently reviewed and interpreted these images. 2 V lumbar xr- ap NL, lateral- NL.

## 2024-09-01 NOTE — HISTORY OF PRESENT ILLNESS
[Neck] : neck [Lower back] : lower back [Work related] : work related [6] : 6 [5] : 5 [Dull/Aching] : dull/aching [Constant] : constant [Household chores] : household chores [Sleep] : sleep [Rest] : rest [Meds] : meds [Sitting] : sitting [Walking] : walking [Lying in bed] : lying in bed [Not working due to injury] : Work status: not working due to injury [de-identified] : 08/28/2024: Patient presenting today for a W/C FUV. Pt reports mild improvement in neck and back pains but still symptomatic.  She reports case has been established since last visit.  Neck- 8/10. Radiates up skull. Weakness in UE. Intermittent dropping items. Intermittent loss of balance. Losing fine motor skills. She has not returned back to work since DOI.  Back- Midline back pains.    07/31/2024: Patient presenting today for a WC FUV on 04/17/24 s/p slip and fall injury. She has not been treating with PT due to w/c not authorizing. She is not working. Neck pain radiates to LUE with weakness and numbness. Neck pain level is an 8/10. Persistent back pain.  LT sided LE radicular pains posteriorly from sciatic notch to posterior calf. Numbness & tingling in LLE.   06/21/2024: Patient presenting today for a W/C FUV. MRI request denied by W/C, reasoning is unknown. She is ambulating with lumbar brace to aid in symptom control, unsure if provided relief. Persistent neck pains. Neck pain level is a 7/10. Radiation to LUE. Intermittent dropping items in left arm. Persistent BL UE and LE paresthesia's.   05/29/2024:  39 Y F presenting today for an initial evaluation. Workplace injury on 04/17/24. 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 Mechanism of injury- pt reports she slid on wet floor in room she entered, struck her head, injured arm, neck and low back in process on fall. Pt did lose consciousness and had an inability to speak for 2 hrs. Seen at [facility] ED images completed (not specified type) but pt did not bring films but verbally reports findings were normal. She reports mensuration started immediately after striking head. Reports neck pain w/ radicular symptoms to LUE. C/o severe neck and low back pains. Numbness/tingling in RUE, pain in LUE. BL LE paresthesia's. Treating w/ Bengay patch and Tylenol, mild relief. Pt has not been working since DOI. Seen by Dr. Davidson, referred to neurologist due to persistent concussion symptoms of headaches. Pt reports Dr. Davidson Rx'd medication that was She unable to take due to insurance. Pt has not treated w/ PT or muscle relaxers at this time. Chief complaint at this time is LT sided neck, trap, lt elbow, low back and LE pains.  [] : no [FreeTextEntry3] : 4/17/2024 [FreeTextEntry6] : Stiffness [FreeTextEntry7] : Lj Buttocks

## 2024-09-07 ENCOUNTER — APPOINTMENT (OUTPATIENT)
Dept: MRI IMAGING | Facility: CLINIC | Age: 39
End: 2024-09-07
Payer: OTHER MISCELLANEOUS

## 2024-09-07 PROCEDURE — 72141 MRI NECK SPINE W/O DYE: CPT

## 2024-09-07 PROCEDURE — 72148 MRI LUMBAR SPINE W/O DYE: CPT

## 2024-10-30 ENCOUNTER — APPOINTMENT (OUTPATIENT)
Dept: ORTHOPEDIC SURGERY | Facility: CLINIC | Age: 39
End: 2024-10-30
Payer: OTHER MISCELLANEOUS

## 2024-10-30 VITALS — BODY MASS INDEX: 27.66 KG/M2 | HEIGHT: 65 IN | WEIGHT: 166 LBS

## 2024-10-30 DIAGNOSIS — G95.9 DISEASE OF SPINAL CORD, UNSPECIFIED: ICD-10-CM

## 2024-10-30 DIAGNOSIS — M54.16 RADICULOPATHY, LUMBAR REGION: ICD-10-CM

## 2024-10-30 DIAGNOSIS — S39.012D STRAIN OF MUSCLE, FASCIA AND TENDON OF LOWER BACK, SUBSEQUENT ENCOUNTER: ICD-10-CM

## 2024-10-30 DIAGNOSIS — S16.1XXD STRAIN OF MUSCLE, FASCIA AND TENDON AT NECK LEVEL, SUBSEQUENT ENCOUNTER: ICD-10-CM

## 2024-10-30 PROCEDURE — 99214 OFFICE O/P EST MOD 30 MIN: CPT

## 2024-11-02 PROBLEM — S39.012D STRAIN OF LUMBAR REGION, SUBSEQUENT ENCOUNTER: Status: ACTIVE | Noted: 2024-11-02

## 2024-11-02 PROBLEM — G95.9 CERVICAL MYELOPATHY: Noted: 2024-05-29

## 2024-11-02 PROBLEM — M54.16 LUMBAR RADICULOPATHY: Noted: 2024-05-29

## 2024-11-02 PROBLEM — S16.1XXD STRAIN OF NECK MUSCLE, SUBSEQUENT ENCOUNTER: Status: ACTIVE | Noted: 2024-11-02

## 2024-11-04 ENCOUNTER — APPOINTMENT (OUTPATIENT)
Dept: ORTHOPEDIC SURGERY | Facility: CLINIC | Age: 39
End: 2024-11-04
Payer: OTHER MISCELLANEOUS

## 2024-11-04 VITALS — WEIGHT: 166 LBS | HEIGHT: 65 IN | BODY MASS INDEX: 27.66 KG/M2

## 2024-11-04 DIAGNOSIS — S53.402A UNSPECIFIED SPRAIN OF LEFT ELBOW, INITIAL ENCOUNTER: ICD-10-CM

## 2024-11-04 DIAGNOSIS — M77.12 LATERAL EPICONDYLITIS, LEFT ELBOW: ICD-10-CM

## 2024-11-04 DIAGNOSIS — S50.02XA CONTUSION OF LEFT ELBOW, INITIAL ENCOUNTER: ICD-10-CM

## 2024-11-04 PROCEDURE — 99213 OFFICE O/P EST LOW 20 MIN: CPT

## 2024-12-02 ENCOUNTER — APPOINTMENT (OUTPATIENT)
Dept: ORTHOPEDIC SURGERY | Facility: CLINIC | Age: 39
End: 2024-12-02
Payer: OTHER MISCELLANEOUS

## 2024-12-02 VITALS — HEIGHT: 65 IN | WEIGHT: 166 LBS | BODY MASS INDEX: 27.66 KG/M2

## 2024-12-02 DIAGNOSIS — S50.02XA CONTUSION OF LEFT ELBOW, INITIAL ENCOUNTER: ICD-10-CM

## 2024-12-02 DIAGNOSIS — M77.12 LATERAL EPICONDYLITIS, LEFT ELBOW: ICD-10-CM

## 2024-12-02 DIAGNOSIS — S53.402A UNSPECIFIED SPRAIN OF LEFT ELBOW, INITIAL ENCOUNTER: ICD-10-CM

## 2024-12-02 PROCEDURE — 99213 OFFICE O/P EST LOW 20 MIN: CPT

## 2024-12-30 ENCOUNTER — APPOINTMENT (OUTPATIENT)
Dept: ORTHOPEDIC SURGERY | Facility: CLINIC | Age: 39
End: 2024-12-30
Payer: OTHER MISCELLANEOUS

## 2024-12-30 VITALS — WEIGHT: 166 LBS | BODY MASS INDEX: 27.66 KG/M2 | HEIGHT: 65 IN

## 2024-12-30 DIAGNOSIS — S53.402A UNSPECIFIED SPRAIN OF LEFT ELBOW, INITIAL ENCOUNTER: ICD-10-CM

## 2024-12-30 PROCEDURE — 99213 OFFICE O/P EST LOW 20 MIN: CPT

## 2025-01-27 ENCOUNTER — APPOINTMENT (OUTPATIENT)
Dept: ORTHOPEDIC SURGERY | Facility: CLINIC | Age: 40
End: 2025-01-27
Payer: OTHER MISCELLANEOUS

## 2025-01-27 DIAGNOSIS — S50.02XA CONTUSION OF LEFT ELBOW, INITIAL ENCOUNTER: ICD-10-CM

## 2025-01-27 DIAGNOSIS — M77.12 LATERAL EPICONDYLITIS, LEFT ELBOW: ICD-10-CM

## 2025-01-27 DIAGNOSIS — S53.402A UNSPECIFIED SPRAIN OF LEFT ELBOW, INITIAL ENCOUNTER: ICD-10-CM

## 2025-01-27 PROCEDURE — 99213 OFFICE O/P EST LOW 20 MIN: CPT

## 2025-02-24 ENCOUNTER — APPOINTMENT (OUTPATIENT)
Dept: ORTHOPEDIC SURGERY | Facility: CLINIC | Age: 40
End: 2025-02-24

## 2025-02-24 DIAGNOSIS — S53.402A UNSPECIFIED SPRAIN OF LEFT ELBOW, INITIAL ENCOUNTER: ICD-10-CM

## 2025-02-24 DIAGNOSIS — M77.12 LATERAL EPICONDYLITIS, LEFT ELBOW: ICD-10-CM

## 2025-02-24 PROCEDURE — 99213 OFFICE O/P EST LOW 20 MIN: CPT

## 2025-03-24 ENCOUNTER — APPOINTMENT (OUTPATIENT)
Dept: ORTHOPEDIC SURGERY | Facility: CLINIC | Age: 40
End: 2025-03-24